# Patient Record
Sex: FEMALE | URBAN - METROPOLITAN AREA
[De-identification: names, ages, dates, MRNs, and addresses within clinical notes are randomized per-mention and may not be internally consistent; named-entity substitution may affect disease eponyms.]

---

## 2018-06-14 ENCOUNTER — IMPORTED ENCOUNTER (OUTPATIENT)
Dept: URBAN - METROPOLITAN AREA CLINIC 31 | Facility: CLINIC | Age: 38
End: 2018-06-14

## 2018-06-14 PROBLEM — E11.9: Noted: 2018-06-14

## 2018-06-14 PROCEDURE — 92015 DETERMINE REFRACTIVE STATE: CPT

## 2018-06-14 PROCEDURE — 92004 COMPRE OPH EXAM NEW PT 1/>: CPT

## 2018-06-14 NOTE — PATIENT DISCUSSION
1. 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 Stressed the importance of regular followup and good control of BS BP and Lipids to avoid future complications. 2.   Migraine

## 2021-02-17 NOTE — PROCEDURE NOTE: CLINICAL
PROCEDURE NOTE: Punctal Plugs, Silicone #2 OU. Diagnosis: Dry Eye Syndrome. Anesthesia: Topical. Prep: Antibiotic Drops q 5min x 3. Prior to treatment, the risks/benefits/alternatives were discussed. The patient wished to proceed with procedure. One drop of proparacaine was placed and a drop of lidocaine gel was placed over the puncta. 0.6 mm permanent silicone plugs were inserted in both eyelids. Patient tolerated procedure well. There were no complications. Post procedure instructions given. Clemencia Higginbotham

## 2021-12-29 NOTE — PATIENT DISCUSSION
Pt reports previous dx of retinal holes inferiorly per retina in [facility]. Lattice inferiorly OU, and superior temporal OS, no atrophic holes noted on today's examination.

## 2021-12-29 NOTE — PATIENT DISCUSSION
Cataracts are visually significant and patient is ready to consider surgery; anticipate improved visual acuity but no guarantee of outcome; refer for consult. Please review referral. Please route back to reception pool #[id number].    Thank you,    Anna Calderon  [facility]

## 2021-12-29 NOTE — PATIENT DISCUSSION
Previous notes from Dr. Yuri Torres in [place] had no specifics on any holes, pt reports seeing a retina specialist that diagnosed atrophic holes inferiorly.

## 2022-02-21 NOTE — PATIENT DISCUSSION
Previous notes from Dr. Zeeshan Killian in [place] had no specifics on any holes, pt reports seeing a retina specialist that diagnosed atrophic holes inferiorly.  The retina doctor in [place] recommended treatment of retinal holes prior to cataract surgery.

## 2022-02-21 NOTE — PATIENT DISCUSSION
The patient was informed that with [address] [facility] for distance, they will need glasses for all near and intermediate activities after surgery. The patient understands there is a possibility they may need an enhancement after surgery. The patient elects [employer] OD, goal of emmetropia.

## 2022-02-21 NOTE — PATIENT DISCUSSION
Pt reports previous dx of retinal holes inferiorly per retina in [facility]. Lattice inferiorly OU, and superior temporal OS, no atrophic holes noted on today's examination.

## 2022-03-15 NOTE — PATIENT DISCUSSION
Pt reports previous dx of retinal holes inferiorly per retina in [facility]. Lattice inferiorly OU, and superotemporal OU.

## 2022-03-15 NOTE — PATIENT DISCUSSION
Recommend laser in [address] this week so that the laser has time to scar in place prior to cataract surgery. Cataract surgery planned for 3/28 and 4/5 with DWS.

## 2022-03-15 NOTE — PATIENT DISCUSSION
The patient has lattice degeneration with atrophic holes. This is often associated with myopic degeneration but can also be found in nonmyopic patients. Approximately 1% of patients with lattice degeneration will progress to symptomatic retinal detachment at some point. Most patients with lattice degeneration with atrophic holes can be observed without treatment. Indications for treatment include retinal detachment in the fellow eye, strong family history of retinal detachment, sudden onset of photopsias, open breaks with subretinal fluid, or extensive vitreal retinal traction. Most patients do not require intervention. The patient has risk factors for progression and will undergo laser photocoagulation. All of the findings were discussed in detail including the possibility of progressive retinal thinning/detachment, dilated pupil, photopsia, difficulty focusing and permanent vision loss with the patient. Retinal detachment warning signs were discussed.

## 2022-03-18 NOTE — PATIENT DISCUSSION
Patient here for Laser # 1 today. Patient to return following cataract surgery for PO in [place] in 3 weeks.

## 2022-03-18 NOTE — PROCEDURE NOTE: CLINICAL
PROCEDURE NOTE: Laser for Lattice Degeneration #1 OD. Diagnosis: Lattice Degeneration with Atrophic Holes. Anesthesia: Topical. Prior to laser, risks/benefits/alternatives to laser discussed including loss of vision, decreased peripheral and night vision, need for more laser and/or surgery and patient wished to proceed. Spot size: KAYE Power: 260 mW. Number: 111 total. Patient tolerated procedure well. There were no complications. Post procedure instructions given. En Antony PROCEDURE NOTE: Laser for Lattice Degeneration #1 OS. Diagnosis: Lattice Degeneration with Atrophic Holes. Anesthesia: Topical. Prior to laser, risks/benefits/alternatives to laser discussed including loss of vision, decreased peripheral and night vision, need for more laser and/or surgery and patient wished to proceed. Spot size: KAYE Power: 260 mW. Number: 111 total. Patient tolerated procedure well. There were no complications. Post procedure instructions given. En Antony

## 2022-03-18 NOTE — PATIENT DISCUSSION
Patient here for laser therapy for prophylactic treatment of high risk oflattice.  Laser # 1 today. Patient to Return following cataract surgery for PO in [facility] in 3 weeks.

## 2022-03-18 NOTE — PATIENT DISCUSSION
Pt reports previous dx of retinal holes inferiorly per retina in [facility]. Lattice inferiorly OU, and superotemporal OU.

## 2022-04-02 ASSESSMENT — VISUAL ACUITY
OS_CC: 20/30
OD_CC: 20/30
OD_SC: J214''
OS_SC: J314''

## 2022-04-02 ASSESSMENT — TONOMETRY
OS_IOP_MMHG: 14
OD_IOP_MMHG: 14

## 2022-04-06 NOTE — PATIENT DISCUSSION
Pt reports previous dx of retinal holes inferiorly per retina in [facility]. Lattice inferiorly OU, and superotemporal OU.

## 2022-04-06 NOTE — PATIENT DISCUSSION
Pt reports previous dx of retinal holes inferiorly per retina in [facility]. Lattice inferiorly OU, and superotemporal OU.

## 2022-04-06 NOTE — PATIENT DISCUSSION
The patient was informed that with [address] [facility] for distance, they will need glasses for all near and intermediate activities after surgery. The patient understands there is a possibility they may need an enhancement after surgery. The patient elects [employer] OD, goal of emmetropia.

## 2022-04-07 NOTE — PATIENT DISCUSSION
Had discussion with patient about near vision being blurry with samuel OU, intermediate also potentially needing some form of reading glasses, pt verbalized understanding.

## 2022-04-11 NOTE — PATIENT DISCUSSION
The patient was informed that with [address] [facility] for distance, they will need glasses for all near and intermediate activities after surgery. The patient understands there is a possibility they may need an enhancement after surgery. The patient elects [employer] OS, goal of emmetropia.

## 2022-04-11 NOTE — PATIENT DISCUSSION
The patient was informed that with [address] [facility] for distance, they will need glasses for all near and intermediate activities after surgery. The patient understands there is a possibility they may need an enhancement after surgery. The patient elects [employer] OS, goal of emmetropia.

## 2022-04-12 NOTE — PATIENT DISCUSSION
The patient was informed that with [address] [facility] for distance, they will need glasses for all near and intermediate activities after surgery. The patient understands there is a possibility they may need an enhancement after surgery. The patient elects [employer] OS, goal of emmetropia.

## 2022-04-12 NOTE — PATIENT DISCUSSION
The patient was informed that with [address] [facility] for distance, they will need glasses for all near and intermediate activities after surgery. The patient understands there is a possibility they may need an enhancement after surgery. The patient elects [employer] OS, goal of emmetropia.

## 2022-04-12 NOTE — PATIENT DISCUSSION
The condition appears to be stable and laser is at full strength, no further therapy is indicated. Routine F/U is needed at this time prn w/ retina and as scheduled w/ Optometry.

## 2022-04-22 NOTE — PATIENT DISCUSSION
The patient was informed that with [address] [facility] for distance, they will need glasses for all near and intermediate activities after surgery. The patient understands there is a possibility they may need an enhancement after surgery. The patient elects [employer] OS, goal of emmetropia.

## 2022-10-14 NOTE — PATIENT DISCUSSION
The patient was informed that with [address] [facility] for distance, they will need glasses for all near and intermediate activities after surgery. The patient understands there is a possibility they may need an enhancement after surgery. The patient elects [employer] OS, goal of emmetropia.

## 2022-10-14 NOTE — PATIENT DISCUSSION
Continue PFATs QID OU, start cequa 1 gt BID OU. RTC 6 weeks for Rx check and dry eye check, discussed 3 months  for full effect.

## 2023-01-18 NOTE — PATIENT DISCUSSION
The patient was informed that with [address] [facility] for distance, they will need glasses for all near and intermediate activities after surgery. The patient understands there is a possibility they may need an enhancement after surgery. The patient elects [employer] OS, goal of emmetropia.

## 2023-03-22 NOTE — PATIENT DISCUSSION
BELOW NOTES PER DWS/LMP VISITS-----dc JONES. Verbal/written post procedure instructions were given to patient/caregiver/Instructed patient/caregiver regarding signs and symptoms of infection